# Patient Record
Sex: FEMALE | Race: BLACK OR AFRICAN AMERICAN | Employment: FULL TIME | URBAN - METROPOLITAN AREA
[De-identification: names, ages, dates, MRNs, and addresses within clinical notes are randomized per-mention and may not be internally consistent; named-entity substitution may affect disease eponyms.]

---

## 2017-12-06 ENCOUNTER — HISTORICAL (OUTPATIENT)
Dept: INTERNAL MEDICINE | Facility: CLINIC | Age: 57
End: 2017-12-06

## 2019-05-02 LAB
CHOLEST SERPL-MCNC: 165 MG/DL
HDLC SERPL-MCNC: 49 MG/DL (ref 35–60)
LDLC SERPL CALC-MCNC: 102 MG/DL
TRIGL SERPL-MCNC: 70 MG/DL (ref 30–150)

## 2019-08-09 LAB
BUN SERPL-MCNC: 23 MG/DL (ref 7–18)
CALCIUM SERPL-MCNC: 10.3 MG/DL (ref 8.5–10.1)
CHLORIDE SERPL-SCNC: 97 MMOL/L (ref 98–107)
CO2 SERPL-SCNC: 28 MMOL/L (ref 21–32)
CREAT SERPL-MCNC: 1.14 MG/DL (ref 0.6–1.3)
GLUCOSE SERPL-MCNC: 123 MG/DL (ref 74–106)
POTASSIUM SERPL-SCNC: 4.6 MMOL/L (ref 3.5–5.1)
SODIUM SERPL-SCNC: 138 MEQ/L (ref 131–145)

## 2021-07-19 LAB
PAP RECOMMENDATION EXT: NORMAL
PAP SMEAR: NORMAL

## 2021-11-30 LAB — GLUCOSE SERPL-MCNC: 96 MG/DL (ref 70–110)

## 2022-01-04 ENCOUNTER — HISTORICAL (OUTPATIENT)
Dept: RADIOLOGY | Facility: HOSPITAL | Age: 62
End: 2022-01-04

## 2022-04-10 ENCOUNTER — HISTORICAL (OUTPATIENT)
Dept: ADMINISTRATIVE | Facility: HOSPITAL | Age: 62
End: 2022-04-10
Payer: COMMERCIAL

## 2022-04-26 VITALS
WEIGHT: 163.81 LBS | BODY MASS INDEX: 25.71 KG/M2 | HEIGHT: 67 IN | DIASTOLIC BLOOD PRESSURE: 75 MMHG | SYSTOLIC BLOOD PRESSURE: 139 MMHG | OXYGEN SATURATION: 98 %

## 2022-09-15 ENCOUNTER — HISTORICAL (OUTPATIENT)
Dept: ADMINISTRATIVE | Facility: HOSPITAL | Age: 62
End: 2022-09-15
Payer: COMMERCIAL

## 2022-12-22 ENCOUNTER — DOCUMENTATION ONLY (OUTPATIENT)
Dept: FAMILY MEDICINE | Facility: CLINIC | Age: 62
End: 2022-12-22
Payer: COMMERCIAL

## 2024-07-11 ENCOUNTER — TELEPHONE (OUTPATIENT)
Dept: ENDOCRINOLOGY | Facility: CLINIC | Age: 64
End: 2024-07-11
Payer: COMMERCIAL

## 2024-07-11 DIAGNOSIS — E83.52 HYPERCALCEMIA: Primary | ICD-10-CM

## 2024-07-11 DIAGNOSIS — E04.2 MULTINODULAR GOITER: ICD-10-CM

## 2024-07-11 NOTE — TELEPHONE ENCOUNTER
Pt called to schedule her 3 year f/u.   According to last visit note from 01/2022 pt will need labs and thyroid u/s prior to f/u.   Orders pended for signature.

## 2025-02-12 ENCOUNTER — HOSPITAL ENCOUNTER (OUTPATIENT)
Dept: RADIOLOGY | Facility: HOSPITAL | Age: 65
Discharge: HOME | End: 2025-02-12
Attending: INTERNAL MEDICINE
Payer: COMMERCIAL

## 2025-02-12 DIAGNOSIS — E04.2 MULTINODULAR GOITER: ICD-10-CM

## 2025-02-12 DIAGNOSIS — E83.52 HYPERCALCEMIA: ICD-10-CM

## 2025-02-12 PROCEDURE — 76536 US EXAM OF HEAD AND NECK: CPT | Mod: TC

## 2025-03-25 ENCOUNTER — OFFICE VISIT (OUTPATIENT)
Dept: ENDOCRINOLOGY | Facility: CLINIC | Age: 65
End: 2025-03-25
Payer: COMMERCIAL

## 2025-03-25 VITALS
HEART RATE: 70 BPM | RESPIRATION RATE: 17 BRPM | HEIGHT: 67 IN | WEIGHT: 155.88 LBS | BODY MASS INDEX: 24.47 KG/M2 | TEMPERATURE: 98 F | DIASTOLIC BLOOD PRESSURE: 73 MMHG | SYSTOLIC BLOOD PRESSURE: 122 MMHG

## 2025-03-25 DIAGNOSIS — R76.8 ANTI-TPO ANTIBODIES PRESENT: ICD-10-CM

## 2025-03-25 DIAGNOSIS — E04.2 MULTINODULAR GOITER: Primary | ICD-10-CM

## 2025-03-25 PROCEDURE — 99214 OFFICE O/P EST MOD 30 MIN: CPT | Mod: PBBFAC

## 2025-03-25 RX ORDER — ATORVASTATIN CALCIUM 40 MG/1
40 TABLET, FILM COATED ORAL
COMMUNITY

## 2025-03-25 RX ORDER — PANTOPRAZOLE SODIUM 40 MG/1
TABLET, DELAYED RELEASE ORAL
COMMUNITY

## 2025-03-25 RX ORDER — LANCETS
1 EACH MISCELLANEOUS
COMMUNITY
Start: 2024-10-10

## 2025-03-25 RX ORDER — CALCIUM POLYCARBOPHIL 625 MG
TABLET ORAL
COMMUNITY

## 2025-03-25 RX ORDER — PSYLLIUM HUSK 0.4 G
CAPSULE ORAL
COMMUNITY

## 2025-03-25 RX ORDER — METFORMIN HYDROCHLORIDE 500 MG/1
500 TABLET, EXTENDED RELEASE ORAL 2 TIMES DAILY
COMMUNITY

## 2025-03-25 RX ORDER — ESTRADIOL 0.1 MG/G
1 CREAM VAGINAL
COMMUNITY

## 2025-03-25 RX ORDER — SPIRONOLACTONE 50 MG/1
50 TABLET, FILM COATED ORAL
COMMUNITY

## 2025-03-25 RX ORDER — CHOLECALCIFEROL (VITAMIN D3) 50 MCG
TABLET ORAL
COMMUNITY

## 2025-03-25 RX ORDER — FLUTICASONE PROPIONATE 50 MCG
SPRAY, SUSPENSION (ML) NASAL
COMMUNITY

## 2025-03-25 RX ORDER — NAPROXEN SODIUM 220 MG/1
TABLET, FILM COATED ORAL
COMMUNITY

## 2025-03-25 RX ORDER — AMLODIPINE BESYLATE 10 MG/1
10 TABLET ORAL
COMMUNITY

## 2025-03-25 RX ORDER — CETIRIZINE HYDROCHLORIDE 10 MG/1
TABLET ORAL
COMMUNITY

## 2025-03-25 RX ORDER — FAMOTIDINE 40 MG/1
TABLET, FILM COATED ORAL
COMMUNITY

## 2025-03-25 NOTE — PROGRESS NOTES
Patient Name: Marilin Azar   : 1960  MRN: [id number]     SUBJECTIVE DATA:    CHIEF COMPLAINT:   Marilin Azar is a 65 y.o. female who presents to clinic today with Hypercalcemia        HPI:  65-year-old female presents to endocrine clinic to follow-up on multi nodule goiter and elevated calcium levels.  Last visit was on 2022.    Labs was completed at outside facility on 2025:  TSH 1.23, intact PTH 37, vitamin-D 54, BUN 19, creatinine 1.13, sodium 141, potassium 4.6, calcium 10.5, phosphorus 3.2, albumin 4.6    Last ultrasound of thyroid was completed on 2025:  Narrative & Impression  EXAMINATION:  US THYROID     CLINICAL HISTORY:  , Hypercalcemia.     TECHNIQUE:  Multiple transverse and sagittal grayscale sonographic images were acquired through the thyroid gland.     FINDINGS:  The right rashida thyroid measures 5.5 x 2.1 x 1.7 cm, left rashida thyroid measures 5.1 x 1.6 x 1.9 cm isthmus measures 7.1 mm in thickness.     There is some heterogenicity of the thyroid parenchyma subcentimeter nodules are identified measuring 5 mm x 3 mm x 4 mm and 4 mm x 4 mm x 5 mm none of these meets criteria for biopsy and or surveillance.  There appears to be a hypoechoic nodule that measures 1.3 x 1.1 x 1.1 cm that might correspond to a TI-RADS type 3 nodule recommendations are follow-up if greater than 1.5 cm at 1 year 3 years and 5 years.     On the left subcentimeter nodules are identified measuring 3 mm x 2 mm x 3 mm, 3 mm x 2 mm x 3 mm and 3 mm x 2 mm x 3 mm none of these nodules meets criteria for biopsy and or surveillance     Impression:     Slight heterogenicity of the thyroid parenchyma.     Apparent index nodule with measurements as above, however, it is hard to ascertain whether these might reflect more of the heterogenicity of the thyroid parenchyma and no represent a true nodule follow-up examination is suggested        Electronically signed by:Matthew Crowder  Date:  "                                           02/13/2025  Time:                                           12:57    Patient denies chest pain, shortness of breath, dyspnea on exertion, palpitations, peripheral edema, abdominal pain, nausea, vomiting, diarrhea, constipation, fatigue, fever, chills, dysuria,  hematuria, dark stools or bloody stools        Plan of care:  Follow-up labs in 1 year.  TSH, renal function panel  Follow-up ultrasound of thyroid in 1 year.  Continue medications as prescribed.  Continue to follow up with PCP.      ALLERGIES:   Review of patient's allergies indicates:   Allergen Reactions    Losartan-hydrochlorothiazide Swelling    Sulfa (sulfonamide antibiotics) Hives         ROS:  Review of Systems   All other systems reviewed and are negative.        OBJECTIVE DATA:  Vital signs  Vitals:    03/25/25 0830   BP: 122/73   BP Location: Right arm   Pulse: 70   Resp: 17   Temp: 98.2 °F (36.8 °C)   Weight: 70.7 kg (155 lb 13.8 oz)   Height: 5' 7" (1.702 m)      Body mass index is 24.41 kg/m².    PHYSICAL EXAM:   Physical Exam  Vitals and nursing note reviewed.   Constitutional:       General: She is awake. She is not in acute distress.     Appearance: Normal appearance. She is well-developed, well-groomed and normal weight. She is not ill-appearing, toxic-appearing or diaphoretic.   HENT:      Head: Normocephalic and atraumatic.      Right Ear: External ear normal.      Left Ear: Ear canal and external ear normal.      Nose: Nose normal.      Mouth/Throat:      Lips: Pink.      Mouth: Mucous membranes are moist.      Pharynx: Oropharynx is clear. Uvula midline.   Eyes:      General: Lids are normal. Gaze aligned appropriately.      Extraocular Movements: Extraocular movements intact.      Conjunctiva/sclera: Conjunctivae normal.      Pupils: Pupils are equal, round, and reactive to light.   Neck:      Thyroid: No thyroid mass, thyromegaly or thyroid tenderness.      Trachea: Trachea and phonation " normal.   Cardiovascular:      Rate and Rhythm: Normal rate and regular rhythm.      Pulses: Normal pulses.           Radial pulses are 2+ on the right side and 2+ on the left side.      Heart sounds: Normal heart sounds.   Pulmonary:      Effort: Pulmonary effort is normal.      Breath sounds: Normal breath sounds and air entry.   Abdominal:      General: Abdomen is flat.      Palpations: Abdomen is soft.      Tenderness: There is no abdominal tenderness.   Musculoskeletal:         General: Normal range of motion.      Cervical back: Normal range of motion and neck supple.      Right lower leg: No edema.      Left lower leg: No edema.   Lymphadenopathy:      Cervical: No cervical adenopathy.   Skin:     General: Skin is warm and dry.      Capillary Refill: Capillary refill takes less than 2 seconds.   Neurological:      General: No focal deficit present.      Mental Status: She is alert and oriented to person, place, and time. Mental status is at baseline.      GCS: GCS eye subscore is 4. GCS verbal subscore is 5. GCS motor subscore is 6.      Cranial Nerves: No cranial nerve deficit.      Sensory: No sensory deficit.      Motor: No weakness.      Coordination: Coordination normal.      Gait: Gait normal.   Psychiatric:         Attention and Perception: Attention and perception normal.         Mood and Affect: Mood and affect normal.         Speech: Speech normal.         Behavior: Behavior normal. Behavior is cooperative.         Thought Content: Thought content normal.         Cognition and Memory: Cognition and memory normal.         Judgment: Judgment normal.          ASSESSMENT/PLAN:  1. Multinodular goiter  Assessment & Plan:  Labs was completed at outside facility on March 18, 2025:  TSH 1.23, intact PTH 37, vitamin-D 54, BUN 19, creatinine 1.13, sodium 141, potassium 4.6, calcium 10.5, phosphorus 3.2, albumin 4.6    Last ultrasound of thyroid was completed on February 13, 2025:  Narrative &  Impression  EXAMINATION:  US THYROID     CLINICAL HISTORY:  , Hypercalcemia.     TECHNIQUE:  Multiple transverse and sagittal grayscale sonographic images were acquired through the thyroid gland.     FINDINGS:  The right rashida thyroid measures 5.5 x 2.1 x 1.7 cm, left rashida thyroid measures 5.1 x 1.6 x 1.9 cm isthmus measures 7.1 mm in thickness.     There is some heterogenicity of the thyroid parenchyma subcentimeter nodules are identified measuring 5 mm x 3 mm x 4 mm and 4 mm x 4 mm x 5 mm none of these meets criteria for biopsy and or surveillance.  There appears to be a hypoechoic nodule that measures 1.3 x 1.1 x 1.1 cm that might correspond to a TI-RADS type 3 nodule recommendations are follow-up if greater than 1.5 cm at 1 year 3 years and 5 years.     On the left subcentimeter nodules are identified measuring 3 mm x 2 mm x 3 mm, 3 mm x 2 mm x 3 mm and 3 mm x 2 mm x 3 mm none of these nodules meets criteria for biopsy and or surveillance     Impression:     Slight heterogenicity of the thyroid parenchyma.     Apparent index nodule with measurements as above, however, it is hard to ascertain whether these might reflect more of the heterogenicity of the thyroid parenchyma and no represent a true nodule follow-up examination is suggested        Electronically signed by:Matthew Crowder  Date:                                            02/13/2025  Time:                                           12:57    Patient denies chest pain, shortness of breath, dyspnea on exertion, palpitations, peripheral edema, abdominal pain, nausea, vomiting, diarrhea, constipation, fatigue, fever, chills, dysuria,  hematuria, dark stools or bloody stools        Plan of care:  Follow-up labs in 1 year.  TSH, renal function   Follow-up ultrasound of thyroid in 1 year.  Continue medications as prescribed.  Continue to follow up with PCP.    Orders:  -     US Thyroid; Future; Expected date: 03/09/2026  -     TSH; Future; Expected date:  03/09/2026  -     Renal Function Panel; Future; Expected date: 03/09/2026    2. Anti-TPO antibodies present  -     US Thyroid; Future; Expected date: 03/09/2026  -     TSH; Future; Expected date: 03/09/2026  -     Renal Function Panel; Future; Expected date: 03/09/2026           RESULTS:  No results found for this or any previous visit (from the past 6 weeks).      Follow Up:  Follow up in about 1 year (around 3/25/2026).     40 minutes of total time spent on the encounter, which includes face to face time and non-face to face time preparing to see the patient (eg, review of tests), Obtaining and/or reviewing separately obtained history, Documenting clinical information in the electronic or other health record, Independently interpreting results (not separately reported) and communicating results to the patient/family/caregiver, or Care coordination (not separately reported).        Previous medical history/lab work/radiology reviewed and considered during medical management decisions.   Medication list reviewed and medication reconciliation performed.  Patient was provided  and care about his/her current diagnosis (es) and medications including risk/benefit and side effects/adverse events, over the counter medication uses/doses, home self-care and contact precautions,  and red flags and indications for when to seek immediate medical attention.   Patient was advised to continue compliance with current medication list and medical recommendations.  Patient dvised continued compliance with recommended eating habits/ diets for medical conditions and exercise 150 minutes/ week (if possible) for medical condition (s).  Educational handouts and instructions on selected disease management in AVS (After Visit Summary).    All of the patient's questions were answered to patient's satisfaction.   The patient was receptive, expressed verbal understanding and agreement the above plan.          This note was created with  the assistance of a voice recognition software or phone dictation. There may be transcription errors as a result of using this technology however minimal. Effort has been made to assure accuracy of transcription but any obvious errors or omissions should be clarified with the author of the document

## 2025-03-25 NOTE — ASSESSMENT & PLAN NOTE
Labs was completed at outside facility on March 18, 2025:  TSH 1.23, intact PTH 37, vitamin-D 54, BUN 19, creatinine 1.13, sodium 141, potassium 4.6, calcium 10.5, phosphorus 3.2, albumin 4.6    Last ultrasound of thyroid was completed on February 13, 2025:  Narrative & Impression  EXAMINATION:  US THYROID     CLINICAL HISTORY:  , Hypercalcemia.     TECHNIQUE:  Multiple transverse and sagittal grayscale sonographic images were acquired through the thyroid gland.     FINDINGS:  The right rashida thyroid measures 5.5 x 2.1 x 1.7 cm, left rashida thyroid measures 5.1 x 1.6 x 1.9 cm isthmus measures 7.1 mm in thickness.     There is some heterogenicity of the thyroid parenchyma subcentimeter nodules are identified measuring 5 mm x 3 mm x 4 mm and 4 mm x 4 mm x 5 mm none of these meets criteria for biopsy and or surveillance.  There appears to be a hypoechoic nodule that measures 1.3 x 1.1 x 1.1 cm that might correspond to a TI-RADS type 3 nodule recommendations are follow-up if greater than 1.5 cm at 1 year 3 years and 5 years.     On the left subcentimeter nodules are identified measuring 3 mm x 2 mm x 3 mm, 3 mm x 2 mm x 3 mm and 3 mm x 2 mm x 3 mm none of these nodules meets criteria for biopsy and or surveillance     Impression:     Slight heterogenicity of the thyroid parenchyma.     Apparent index nodule with measurements as above, however, it is hard to ascertain whether these might reflect more of the heterogenicity of the thyroid parenchyma and no represent a true nodule follow-up examination is suggested        Electronically signed by:Matthew Crowder  Date:                                            02/13/2025  Time:                                           12:57    Patient denies chest pain, shortness of breath, dyspnea on exertion, palpitations, peripheral edema, abdominal pain, nausea, vomiting, diarrhea, constipation, fatigue, fever, chills, dysuria,  hematuria, dark stools or bloody stools        Plan of  care:  Follow-up labs in 1 year.  TSH, renal function   Follow-up ultrasound of thyroid in 1 year.  Continue medications as prescribed.  Continue to follow up with PCP.